# Patient Record
Sex: MALE | Race: BLACK OR AFRICAN AMERICAN | URBAN - METROPOLITAN AREA
[De-identification: names, ages, dates, MRNs, and addresses within clinical notes are randomized per-mention and may not be internally consistent; named-entity substitution may affect disease eponyms.]

---

## 2017-10-02 ENCOUNTER — HOME HEALTH ADMISSION (OUTPATIENT)
Dept: HOME HEALTH SERVICES | Facility: HOME HEALTH | Age: 22
End: 2017-10-02
Payer: COMMERCIAL

## 2017-10-03 ENCOUNTER — HOME CARE VISIT (OUTPATIENT)
Dept: SCHEDULING | Facility: HOME HEALTH | Age: 22
End: 2017-10-03
Payer: COMMERCIAL

## 2017-10-03 VITALS
TEMPERATURE: 98 F | RESPIRATION RATE: 16 BRPM | HEART RATE: 97 BPM | DIASTOLIC BLOOD PRESSURE: 70 MMHG | OXYGEN SATURATION: 97 % | SYSTOLIC BLOOD PRESSURE: 118 MMHG

## 2017-10-03 VITALS — RESPIRATION RATE: 16 BRPM | OXYGEN SATURATION: 98 % | TEMPERATURE: 99.1 F

## 2017-10-03 PROCEDURE — G0152 HHCP-SERV OF OT,EA 15 MIN: HCPCS

## 2017-10-03 PROCEDURE — G0151 HHCP-SERV OF PT,EA 15 MIN: HCPCS

## 2017-10-12 ENCOUNTER — HOME CARE VISIT (OUTPATIENT)
Dept: SCHEDULING | Facility: HOME HEALTH | Age: 22
End: 2017-10-12
Payer: COMMERCIAL

## 2017-10-12 VITALS
HEART RATE: 90 BPM | OXYGEN SATURATION: 98 % | DIASTOLIC BLOOD PRESSURE: 70 MMHG | RESPIRATION RATE: 16 BRPM | SYSTOLIC BLOOD PRESSURE: 110 MMHG

## 2017-10-12 PROCEDURE — G0151 HHCP-SERV OF PT,EA 15 MIN: HCPCS

## 2017-10-16 ENCOUNTER — HOME CARE VISIT (OUTPATIENT)
Dept: SCHEDULING | Facility: HOME HEALTH | Age: 22
End: 2017-10-16
Payer: COMMERCIAL

## 2017-10-16 VITALS — DIASTOLIC BLOOD PRESSURE: 70 MMHG | SYSTOLIC BLOOD PRESSURE: 118 MMHG | RESPIRATION RATE: 16 BRPM

## 2017-10-16 PROCEDURE — G0151 HHCP-SERV OF PT,EA 15 MIN: HCPCS

## 2017-10-20 ENCOUNTER — HOME CARE VISIT (OUTPATIENT)
Dept: SCHEDULING | Facility: HOME HEALTH | Age: 22
End: 2017-10-20
Payer: COMMERCIAL

## 2017-10-20 VITALS — DIASTOLIC BLOOD PRESSURE: 70 MMHG | SYSTOLIC BLOOD PRESSURE: 118 MMHG | RESPIRATION RATE: 16 BRPM

## 2017-10-20 PROCEDURE — G0151 HHCP-SERV OF PT,EA 15 MIN: HCPCS

## 2017-10-23 ENCOUNTER — HOME CARE VISIT (OUTPATIENT)
Dept: SCHEDULING | Facility: HOME HEALTH | Age: 22
End: 2017-10-23
Payer: COMMERCIAL

## 2017-10-23 VITALS — RESPIRATION RATE: 16 BRPM | SYSTOLIC BLOOD PRESSURE: 118 MMHG | DIASTOLIC BLOOD PRESSURE: 70 MMHG

## 2017-10-23 PROCEDURE — G0151 HHCP-SERV OF PT,EA 15 MIN: HCPCS

## 2017-10-25 ENCOUNTER — HOME CARE VISIT (OUTPATIENT)
Dept: SCHEDULING | Facility: HOME HEALTH | Age: 22
End: 2017-10-25
Payer: COMMERCIAL

## 2017-10-25 VITALS — RESPIRATION RATE: 16 BRPM | SYSTOLIC BLOOD PRESSURE: 118 MMHG | DIASTOLIC BLOOD PRESSURE: 70 MMHG

## 2017-10-25 PROCEDURE — G0151 HHCP-SERV OF PT,EA 15 MIN: HCPCS

## 2017-10-31 ENCOUNTER — HOME CARE VISIT (OUTPATIENT)
Dept: HOME HEALTH SERVICES | Facility: HOME HEALTH | Age: 22
End: 2017-10-31
Payer: COMMERCIAL

## 2017-11-02 ENCOUNTER — HOME CARE VISIT (OUTPATIENT)
Dept: SCHEDULING | Facility: HOME HEALTH | Age: 22
End: 2017-11-02
Payer: COMMERCIAL

## 2017-11-02 VITALS — SYSTOLIC BLOOD PRESSURE: 115 MMHG | RESPIRATION RATE: 16 BRPM | DIASTOLIC BLOOD PRESSURE: 70 MMHG

## 2017-11-02 PROCEDURE — G0151 HHCP-SERV OF PT,EA 15 MIN: HCPCS

## 2022-12-29 ENCOUNTER — HOSPITAL ENCOUNTER (EMERGENCY)
Age: 27
Discharge: HOME | End: 2022-12-29
Attending: EMERGENCY MEDICINE
Payer: COMMERCIAL

## 2022-12-29 VITALS
HEART RATE: 78 BPM | HEIGHT: 72 IN | OXYGEN SATURATION: 100 % | TEMPERATURE: 97.9 F | RESPIRATION RATE: 16 BRPM | SYSTOLIC BLOOD PRESSURE: 117 MMHG | BODY MASS INDEX: 22.9 KG/M2 | WEIGHT: 169.09 LBS | DIASTOLIC BLOOD PRESSURE: 71 MMHG

## 2022-12-29 DIAGNOSIS — K08.89 PAIN, DENTAL: Primary | ICD-10-CM

## 2022-12-29 PROCEDURE — 99283 EMERGENCY DEPT VISIT LOW MDM: CPT

## 2022-12-29 RX ORDER — PENICILLIN V POTASSIUM 500 MG/1
500 TABLET, FILM COATED ORAL 4 TIMES DAILY
Qty: 40 TABLET | Refills: 0 | Status: SHIPPED | OUTPATIENT
Start: 2022-12-29 | End: 2023-01-08

## 2022-12-29 NOTE — ED PROVIDER NOTES
EMERGENCY DEPARTMENT HISTORY AND PHYSICAL EXAM      Date: 12/29/2022  Patient Name: Hayes Castle    History of Presenting Illness     Chief Complaint   Patient presents with    Dental Pain     Patient believes XIOMY tooth is infected; he saw dentist 2 months ago and said the tooth needs to come out; on/off pain x2 weeks. History Provided By: Patient    HPI: Sophia Elam, 33 y.o. male with a past medical history significant for medical problems as stated below presents to the ED with cc of dental pain. Sounds it has been an ongoing issue for a while. Patient states increasing pain for the last few weeks. Does have a dentist he can follow-up with. No fever. Does not like taking over-the-counter medication for pain. Came here as he thought he might need an antibiotic. No other constitutional symptoms reported    There are no associated symptoms. No other exacerbating or ameliorating factors. PCP: Jer Palma MD    No current facility-administered medications on file prior to encounter. Current Outpatient Medications on File Prior to Encounter   Medication Sig Dispense Refill    baclofen (LIORESAL) 10 mg tablet Take 5 mg by mouth daily. oxyCODONE IR (ROXICODONE) 5 mg immediate release tablet Take 10 mg by mouth every six (6) hours as needed for Pain. naproxen (NAPROSYN) 500 mg tablet Take 500 mg by mouth two (2) times daily (with meals). ACETAMINOPHEN/DIPHENHYDRAMINE (TYLENOL PM EXTRA STRENGTH PO) Take 500 mg by mouth every eight (8) hours as needed (pain). enoxaparin (LOVENOX) 30 mg/0.3 mL injection 30 mg by SubCUTAneous route every twelve (12) hours. Past History     Past Medical History:  No past medical history on file. Past Surgical History:  No past surgical history on file. Family History:  No family history on file. Social History:        Allergies:  No Known Allergies      Review of Systems   Review of Systems   Constitutional:  Negative for fever. HENT:  Positive for dental problem. Respiratory:  Negative for stridor. Musculoskeletal:  Negative for gait problem. Neurological:  Negative for dizziness. Psychiatric/Behavioral:  Negative for agitation. Physical Exam   Physical Exam  Constitutional:       Appearance: He is not toxic-appearing. HENT:      Right Ear: Tympanic membrane normal.      Left Ear: Tympanic membrane normal.      Mouth/Throat:      Pharynx: No oropharyngeal exudate or posterior oropharyngeal erythema. Comments: Significant decay of the last left lower molars present. However no drainable abscess. No facial, subicular, submental swelling. Uvula midline. No stridor or trismus. No ludwigs      Diagnostic Study Results     Labs -   No results found for this or any previous visit (from the past 24 hour(s)). Radiologic Studies -   No orders to display     CT Results  (Last 48 hours)      None          CXR Results  (Last 48 hours)      None              Medical Decision Making   I am the first provider for this patient. I reviewed the vital signs, available nursing notes, past medical history, past surgical history, family history and social history. Vital Signs-Reviewed the patient's vital signs. Patient Vitals for the past 12 hrs:   Temp Pulse Resp BP SpO2   12/29/22 1137 97.9 °F (36.6 °C) 78 16 117/71 100 %       Records Reviewed: Nursing records and medical records reviewed    MDM:    Medical Decision Making          ED Course:   Initial assessment performed. The patients presenting problems have been discussed, and they are in agreement with the care plan formulated and outlined with them. I have encouraged them to ask questions as they arise throughout their visit. Patient remained stable while in the emergency department. Afebrile. Airway completely intact. Nontoxic. We will start the patient on a course of penicillin for dental pain unit.   Recommend over-the-counter medication for pain.  He has a dentist he can follow-up closely with. Return precautions discussed                   Disposition:  Home    DISCHARGE PLAN:  1. Current Discharge Medication List        2. Follow-up Information    None       3. Return to ED if worse     Diagnosis     Clinical Impression: No diagnosis found. Attestations:    Deepti Solitario PA-C    Please note that this dictation was completed with [employer], the computer voice recognition software. Quite often unanticipated grammatical, syntax, homophones, and other interpretive errors are inadvertently transcribed by the computer software. Please disregard these errors. Please excuse any errors that have escaped final proofreading. Thank you.

## 2022-12-29 NOTE — DISCHARGE INSTRUCTIONS
Please call and schedule follow-up appointment with your dentist.  If you would have any worsening symptoms such as a fever, facial swelling, difficulty swallowing, shortness of breath you need to return here

## 2023-03-23 ENCOUNTER — OFFICE VISIT (OUTPATIENT)
Dept: INTERNAL MEDICINE CLINIC | Age: 28
End: 2023-03-23
Payer: MEDICARE

## 2023-03-23 VITALS
SYSTOLIC BLOOD PRESSURE: 106 MMHG | BODY MASS INDEX: 23.03 KG/M2 | TEMPERATURE: 98.3 F | DIASTOLIC BLOOD PRESSURE: 76 MMHG | WEIGHT: 170 LBS | HEART RATE: 72 BPM | OXYGEN SATURATION: 97 % | HEIGHT: 72 IN | RESPIRATION RATE: 18 BRPM

## 2023-03-23 DIAGNOSIS — Z00.00 VISIT FOR WELL MAN HEALTH CHECK: Primary | ICD-10-CM

## 2023-03-23 DIAGNOSIS — Z11.59 NEED FOR HEPATITIS C SCREENING TEST: ICD-10-CM

## 2023-03-23 DIAGNOSIS — G56.03 BILATERAL CARPAL TUNNEL SYNDROME: ICD-10-CM

## 2023-03-23 DIAGNOSIS — F41.8 ANXIETY WITH DEPRESSION: ICD-10-CM

## 2023-03-23 PROCEDURE — 99214 OFFICE O/P EST MOD 30 MIN: CPT | Performed by: FAMILY MEDICINE

## 2023-03-23 PROCEDURE — 99385 PREV VISIT NEW AGE 18-39: CPT | Performed by: FAMILY MEDICINE

## 2023-03-23 RX ORDER — SERTRALINE HYDROCHLORIDE 100 MG/1
100 TABLET, FILM COATED ORAL DAILY
COMMUNITY
Start: 2023-03-16

## 2023-03-23 NOTE — PROGRESS NOTES
Chief Complaint   Patient presents with    Complete Physical     Patient is here for a wellness visit. Form Completion     Patient states he has FMLA for carpal tunnel. he is a 33y.o. year old male who presents for CPE. Complete Physical Exam Questions:    1. Do you follow a low fat diet?  no  2. Are you up to date on your Tdap (<10 years)? No  3. Have you ever had a Pneumovax vaccine (>65)? No   TGD23 Not applicable   KIOV14 Not applicable  4. Have you had Zoster vaccine (>60)? Not applicable  5. Have you had the HPV - Gardasil (13- 26)? Not applicable  6. Do you follow an exercise program?  no  7. Do you smoke?  yes If > 65 and smoker, have you had a abdominal aortic aneurysm ultrasound screen? No  8. Do you consider yourself overweight?  no  9. Is there a family history of CAD< age 48? No  10. Is there a family history of Cancer? No  11. Do you know your Cancer risks? No  12. Have you had a colonoscopy? No  13. Have you been tested for HIV or other STI's? No HIV today(18-64 y/o)? No   14. Have you had an EKG in the last five years(>50)? No  15. Have you had a PSA test done this year (50-69)? No    Other complaints: none    Reviewed and agree with Nurse Note and duplicated in this note. Reviewed PmHx, RxHx, FmHx, SocHx, AllgHx and updated and dated in the chart. No family history on file. No past medical history on file.    Social History     Socioeconomic History    Marital status: SINGLE        Review of Systems - negative except as listed above      Objective:     Vitals:    03/23/23 1440   BP: 106/76   Pulse: 72   Resp: 18   Temp: 98.3 °F (36.8 °C)   SpO2: 97%   Weight: 170 lb (77.1 kg)   Height: 6' (1.829 m)       Physical Examination: General appearance - alert, well appearing, and in no distress  Eyes - pupils equal and reactive, extraocular eye movements intact  Ears - bilateral TM's and external ear canals normal  Nose - normal and patent, no erythema, discharge or polyps  Mouth - mucous membranes moist, pharynx normal without lesions  Neck - supple, no significant adenopathy  Chest - clear to auscultation, no wheezes, rales or rhonchi, symmetric air entry  Heart - normal rate, regular rhythm, normal S1, S2, no murmurs, rubs, clicks or gallops  Abdomen - soft, nontender, nondistended, no masses or organomegaly  Neurological - alert, oriented, normal speech, no focal findings or movement disorder noted  Musculoskeletal - no joint tenderness, deformity or swelling  Extremities - peripheral pulses normal, no pedal edema, no clubbing or cyanosis  Skin - normal coloration and turgor, no rashes, no suspicious skin lesions noted       Assessment/ Plan:   Diagnoses and all orders for this visit:    1. Visit for well man health check  -     CBC W/O DIFF; Future  -     LIPID PANEL; Future  -     METABOLIC PANEL, COMPREHENSIVE; Future    2. Anxiety with depression    3. Bilateral carpal tunnel syndrome  -     REFERRAL TO ORTHOPEDICS  -     REFERRAL TO PHYSICAL THERAPY    4. Need for hepatitis C screening test  -     HEPATITIS C AB; Future         Labs to be drawn: CBC, CMP, Lipid            I have discussed the diagnosis with the patient and the intended plan as seen in the above orders. The patient has received an after-visit summary and questions were answered concerning future plans. Medication Side Effects and Warnings were discussed with patient,  Patient Labs were reviewed and or requested, and  Patient Past Records were reviewed and or requested  yes     Chief Complaint   Patient presents with    Complete Physical     Patient is here for a wellness visit. Form Completion     Patient states he has FMLA for carpal tunnel. he is a 33y.o. year old male who presents for evaluation of bilateral wrist pain. 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 Patient states that he has bilateral carpal tunnel.   No injections or therapy has been tried. States that braces do not work. No pain currently but when at work go up to 8 out of 10. Reviewed and agree with Nurse Note and duplicated in this note. Reviewed PmHx, RxHx, FmHx, SocHx, AllgHx and updated and dated in the chart. History reviewed. No pertinent family history. History reviewed. No pertinent past medical history.    Social History     Socioeconomic History    Marital status: SINGLE   Tobacco Use    Smoking status: Every Day     Types: Cigarettes    Smokeless tobacco: Never   Substance and Sexual Activity    Alcohol use: Never    Drug use: Never    Sexual activity: Yes     Partners: Female        Review of Systems - negative except as listed above      Objective:     Vitals:    03/23/23 1440   BP: 106/76   Pulse: 72   Resp: 18   Temp: 98.3 °F (36.8 °C)   SpO2: 97%   Weight: 170 lb (77.1 kg)   Height: 6' (1.829 m)       Physical Examination: General appearance - alert, well appearing, and in no distress  Back exam - full range of motion, no tenderness, palpable spasm or pain on motion  Neurological - alert, oriented, normal speech, no focal findings or movement disorder noted  Musculoskeletal - Wrist: Bilateral  Wrist Effusion: mild   Deformity: none     ROM:  full range of motion     Palpation:  Snuff box tenderness: none  TFCC tenderness: none  Ulna styloid tenderness: none  Distal radius tenderness: none  Hook of Hamate tenderness: none  Second compartment (intersection) tenderness: none     Other test:  Raymundos test: Negative  Finkelstein's Test: Positive   Phalen's Test: Negative   Tinel's Test: Positive    Strength (0-5/5):   Flexion:5/5  Extension: 5/5  : 5/5  Intrinsics: 5/5  EPL (extensor pollicis longus): 5/5  Pinch mechanism: 5/5    Extremities - peripheral pulses normal, no pedal edema, no clubbing or cyanosis  Skin - normal coloration and turgor, no rashes, no suspicious skin lesions noted     Assessment/ Plan:   Diagnoses and all orders for this visit:    1. Visit for well man health check    2. Anxiety with depression    3. Bilateral carpal tunnel syndrome  -     REFERRAL TO ORTHOPEDICS  -     REFERRAL TO PHYSICAL THERAPY       We will consider steroid injections for patient       I have discussed the diagnosis with the patient and the intended plan as seen in the above orders. The patient has received an after-visit summary and questions were answered concerning future plans. Medication Side Effects and Warnings were discussed with patient,  Patient Labs were reviewed and or requested, and  Patient Past Records were reviewed and or requested  yes       Pt agrees to call or return to clinic and/or go to closest ER with any worsening of symptoms. This may include, but not limited to increased fever (>100.4) with NSAIDS or Tylenol, increased edema, confusion, rash, worsening of presenting symptoms. Please note that this dictation was completed with [employer], the computer voice recognition software. Quite often unanticipated grammatical, syntax, homophones, and other interpretive errors are inadvertently transcribed by the computer software. Please disregard these errors. Please excuse any errors that have escaped final proofreading. Thank you.

## 2023-03-24 DIAGNOSIS — D64.9 ANEMIA, UNSPECIFIED TYPE: Primary | ICD-10-CM

## 2023-03-24 LAB
ALBUMIN SERPL-MCNC: 3.9 G/DL (ref 3.5–5)
ALBUMIN/GLOB SERPL: 1.1 (ref 1.1–2.2)
ALP SERPL-CCNC: 92 U/L (ref 45–117)
ALT SERPL-CCNC: 39 U/L (ref 12–78)
ANION GAP SERPL CALC-SCNC: 2 MMOL/L (ref 5–15)
AST SERPL-CCNC: 24 U/L (ref 15–37)
BILIRUB SERPL-MCNC: 0.2 MG/DL (ref 0.2–1)
BUN SERPL-MCNC: 19 MG/DL (ref 6–20)
BUN/CREAT SERPL: 23 (ref 12–20)
CALCIUM SERPL-MCNC: 9.3 MG/DL (ref 8.5–10.1)
CHLORIDE SERPL-SCNC: 102 MMOL/L (ref 97–108)
CHOLEST SERPL-MCNC: 159 MG/DL
CO2 SERPL-SCNC: 34 MMOL/L (ref 21–32)
CREAT SERPL-MCNC: 0.82 MG/DL (ref 0.7–1.3)
ERYTHROCYTE [DISTWIDTH] IN BLOOD BY AUTOMATED COUNT: 15.7 % (ref 11.5–14.5)
GLOBULIN SER CALC-MCNC: 3.4 G/DL (ref 2–4)
GLUCOSE SERPL-MCNC: 87 MG/DL (ref 65–100)
HCT VFR BLD AUTO: 40.8 % (ref 36.6–50.3)
HCV AB SERPL QL IA: NONREACTIVE
HDLC SERPL-MCNC: 57 MG/DL
HDLC SERPL: 2.8 (ref 0–5)
HGB BLD-MCNC: 11.9 G/DL (ref 12.1–17)
LDLC SERPL CALC-MCNC: 67.8 MG/DL (ref 0–100)
MCH RBC QN AUTO: 22.5 PG (ref 26–34)
MCHC RBC AUTO-ENTMCNC: 29.2 G/DL (ref 30–36.5)
MCV RBC AUTO: 77.1 FL (ref 80–99)
NRBC # BLD: 0 K/UL (ref 0–0.01)
NRBC BLD-RTO: 0 PER 100 WBC
PLATELET # BLD AUTO: 210 K/UL (ref 150–400)
PMV BLD AUTO: 11.3 FL (ref 8.9–12.9)
POTASSIUM SERPL-SCNC: 4.3 MMOL/L (ref 3.5–5.1)
PROT SERPL-MCNC: 7.3 G/DL (ref 6.4–8.2)
RBC # BLD AUTO: 5.29 M/UL (ref 4.1–5.7)
SODIUM SERPL-SCNC: 138 MMOL/L (ref 136–145)
TRIGL SERPL-MCNC: 171 MG/DL (ref ?–150)
VLDLC SERPL CALC-MCNC: 34.2 MG/DL
WBC # BLD AUTO: 5.6 K/UL (ref 4.1–11.1)